# Patient Record
Sex: FEMALE | Race: WHITE | Employment: FULL TIME | URBAN - METROPOLITAN AREA
[De-identification: names, ages, dates, MRNs, and addresses within clinical notes are randomized per-mention and may not be internally consistent; named-entity substitution may affect disease eponyms.]

---

## 2017-02-27 LAB — MAMMOGRAPHY, EXTERNAL: NORMAL

## 2018-10-31 ENCOUNTER — OFFICE VISIT (OUTPATIENT)
Dept: FAMILY MEDICINE CLINIC | Age: 48
End: 2018-10-31

## 2018-10-31 VITALS
HEART RATE: 66 BPM | BODY MASS INDEX: 23.49 KG/M2 | SYSTOLIC BLOOD PRESSURE: 108 MMHG | OXYGEN SATURATION: 98 % | WEIGHT: 137.6 LBS | RESPIRATION RATE: 18 BRPM | HEIGHT: 64 IN | TEMPERATURE: 97.8 F | DIASTOLIC BLOOD PRESSURE: 70 MMHG

## 2018-10-31 DIAGNOSIS — Z23 ENCOUNTER FOR IMMUNIZATION: ICD-10-CM

## 2018-10-31 DIAGNOSIS — Z00.00 ROUTINE GENERAL MEDICAL EXAMINATION AT A HEALTH CARE FACILITY: Primary | ICD-10-CM

## 2018-10-31 NOTE — PROGRESS NOTES
Chief Complaint Patient presents with  Anemia  
  gyn advised pcp check labs due to clotting during menses - otherwise patient feels well and healthy 1. Have you been to the ER, urgent care clinic since your last visit? Hospitalized since your last visit? No 
 
2. Have you seen or consulted any other health care providers outside of the [address] [place] [address] since your last visit? Include any pap smears or colon screening.  No

## 2018-10-31 NOTE — PATIENT INSTRUCTIONS

## 2018-10-31 NOTE — LETTER
11/11/2018 [address] [place] [address] [phone] [id number] Dear Clare Flores: 
 
Please find your most recent results below. Resulted Orders CBC W/O DIFF Result Value Ref Range WBC 4.2 3.4 - 10.8 x10E3/uL  
 RBC 4.00 3.77 - 5.28 x10E6/uL HGB 12.8 11.1 - 15.9 g/dL HCT 39.7 34.0 - 46.6 % MCV 99 (H) 79 - 97 fL  
 MCH 32.0 26.6 - 33.0 pg  
 MCHC 32.2 31.5 - 35.7 g/dL  
 RDW 13.3 12.3 - 15.4 % PLATELET 881 126 - 002 x10E3/uL Narrative Performed at:  [address]  [id number] : Michael Pérez MD, Phone:  [phone] LIPID PANEL Result Value Ref Range Cholesterol, total 150 100 - 199 mg/dL Triglyceride 54 0 - 149 mg/dL HDL Cholesterol 75 >39 mg/dL VLDL, calculated 11 5 - 40 mg/dL LDL, calculated 64 0 - 99 mg/dL Narrative Performed at:  [address]  [id number] : Michael Pérez MD, Phone:  [phone] METABOLIC PANEL, COMPREHENSIVE Result Value Ref Range Glucose 85 65 - 99 mg/dL BUN 10 6 - 24 mg/dL Creatinine 0.66 0.57 - 1.00 mg/dL GFR est non- >59 mL/min/1.73 GFR est  >59 mL/min/1.73  
 BUN/Creatinine ratio 15 9 - 23 Sodium 143 134 - 144 mmol/L Potassium 4.4 3.5 - 5.2 mmol/L Chloride 105 96 - 106 mmol/L  
 CO2 24 20 - 29 mmol/L Calcium 9.7 8.7 - 10.2 mg/dL Protein, total 7.1 6.0 - 8.5 g/dL Albumin 4.7 3.5 - 5.5 g/dL GLOBULIN, TOTAL 2.4 1.5 - 4.5 g/dL A-G Ratio 2.0 1.2 - 2.2 Bilirubin, total 0.5 0.0 - 1.2 mg/dL Alk. phosphatase 52 39 - 117 IU/L  
 AST (SGOT) 17 0 - 40 IU/L  
 ALT (SGPT) 17 0 - 32 IU/L Narrative Performed at:  [address]  [id number] : Michael Pérez MD, Phone:  [phone] TSH 3RD GENERATION Result Value Ref Range TSH/Thyroid function 2.690 0.450 - 4.500 uIU/mL Narrative Performed at:  [address]  [id number] : Opal Willams MD, Phone:  [phone] CVD REPORT Result Value Ref Range INTERPRETATION Note Comment:  
   Supplemental report is available. Narrative Performed at:  [address] [place] [address]  [id number] : Gian Soto MD, Phone:  [phone] Overall things are looking good! Please call me if you have any questions: [phone]. Otherwise schedule your next annual fasting checkup for one year. Sincerely, Carl Villafuerte MD

## 2018-11-01 LAB
ALBUMIN SERPL-MCNC: 4.7 G/DL (ref 3.5–5.5)
ALBUMIN/GLOB SERPL: 2 {RATIO} (ref 1.2–2.2)
ALP SERPL-CCNC: 52 IU/L (ref 39–117)
ALT SERPL-CCNC: 17 IU/L (ref 0–32)
AST SERPL-CCNC: 17 IU/L (ref 0–40)
BILIRUB SERPL-MCNC: 0.5 MG/DL (ref 0–1.2)
BUN SERPL-MCNC: 10 MG/DL (ref 6–24)
BUN/CREAT SERPL: 15 (ref 9–23)
CALCIUM SERPL-MCNC: 9.7 MG/DL (ref 8.7–10.2)
CHLORIDE SERPL-SCNC: 105 MMOL/L (ref 96–106)
CHOLEST SERPL-MCNC: 150 MG/DL (ref 100–199)
CO2 SERPL-SCNC: 24 MMOL/L (ref 20–29)
CREAT SERPL-MCNC: 0.66 MG/DL (ref 0.57–1)
ERYTHROCYTE [DISTWIDTH] IN BLOOD BY AUTOMATED COUNT: 13.3 % (ref 12.3–15.4)
GLOBULIN SER CALC-MCNC: 2.4 G/DL (ref 1.5–4.5)
GLUCOSE SERPL-MCNC: 85 MG/DL (ref 65–99)
HCT VFR BLD AUTO: 39.7 % (ref 34–46.6)
HDLC SERPL-MCNC: 75 MG/DL
HGB BLD-MCNC: 12.8 G/DL (ref 11.1–15.9)
INTERPRETATION, 910389: NORMAL
LDLC SERPL CALC-MCNC: 64 MG/DL (ref 0–99)
MCH RBC QN AUTO: 32 PG (ref 26.6–33)
MCHC RBC AUTO-ENTMCNC: 32.2 G/DL (ref 31.5–35.7)
MCV RBC AUTO: 99 FL (ref 79–97)
PLATELET # BLD AUTO: 186 X10E3/UL (ref 150–379)
POTASSIUM SERPL-SCNC: 4.4 MMOL/L (ref 3.5–5.2)
PROT SERPL-MCNC: 7.1 G/DL (ref 6–8.5)
RBC # BLD AUTO: 4 X10E6/UL (ref 3.77–5.28)
SODIUM SERPL-SCNC: 143 MMOL/L (ref 134–144)
TRIGL SERPL-MCNC: 54 MG/DL (ref 0–149)
TSH SERPL DL<=0.005 MIU/L-ACNC: 2.69 UIU/ML (ref 0.45–4.5)
VLDLC SERPL CALC-MCNC: 11 MG/DL (ref 5–40)
WBC # BLD AUTO: 4.2 X10E3/UL (ref 3.4–10.8)

## 2018-11-29 ENCOUNTER — TELEPHONE (OUTPATIENT)
Dept: FAMILY MEDICINE CLINIC | Age: 48
End: 2018-11-29

## 2018-11-29 NOTE — TELEPHONE ENCOUNTER
Patient is calling requesting a callback with lab results, patient have not yet to receive anything in the mail.       Best callback: [phone]    LOV:  Wednesday, October 31, 2018

## 2018-12-05 NOTE — TELEPHONE ENCOUNTER
attempted to reach patient to see if she received her letter with results. No answer.  Phone kept ringing    Will close encounter at this time

## 2018-12-18 ENCOUNTER — TELEPHONE (OUTPATIENT)
Dept: FAMILY MEDICINE CLINIC | Age: 48
End: 2018-12-18

## 2018-12-18 NOTE — TELEPHONE ENCOUNTER
----- Message from Janice Willarda sent at 12/18/2018  4:49 PM EST -----  Regarding: Dr. Krishnamurthy Goods: [phone]  Pt would like a recommendation - she says she has been experiencing numbness and tingling in her right thumb for about ten days. Pt says she was doing Yancy cards (about 400) and this is when it started.

## 2018-12-19 NOTE — TELEPHONE ENCOUNTER
Call to patient.  verified. Patient states she was embossing 400 mariann cards and she began to experience numbness and tingling 10 days after in her right thumb    She wants to know if she needs to be seen or referred out or if there are other therapies that can be done. I informed her she most likely does not need to be seen by ortho. She states Dr. Chris Cochran is her pcp but I informed it is documented that Dr. Laura Scott is her pcp as she established are in 2016.  She would like Dr. Chris Cochran to review this message

## 2018-12-20 NOTE — TELEPHONE ENCOUNTER
Call to patient.  verified.  Informed patient that an office visit is needed for proper eval. She stats she will wait until after the holidays and if it is still bothering her she will come in

## 2021-04-06 ENCOUNTER — TRANSCRIBE ORDER (OUTPATIENT)
Dept: SCHEDULING | Age: 51
End: 2021-04-06

## 2021-04-06 DIAGNOSIS — M54.16 LUMBAR RADICULITIS: Primary | ICD-10-CM

## 2021-04-09 ENCOUNTER — HOSPITAL ENCOUNTER (OUTPATIENT)
Dept: MRI IMAGING | Age: 51
Discharge: HOME | End: 2021-04-09
Attending: NURSE PRACTITIONER
Payer: COMMERCIAL

## 2021-04-09 DIAGNOSIS — M54.16 LUMBAR RADICULITIS: ICD-10-CM

## 2021-04-09 PROCEDURE — 72148 MRI LUMBAR SPINE W/O DYE: CPT

## 2021-11-30 ENCOUNTER — TELEPHONE (OUTPATIENT)
Dept: FAMILY MEDICINE CLINIC | Age: 51
End: 2021-11-30

## 2021-11-30 NOTE — TELEPHONE ENCOUNTER
Pt states that the family is passing around sinus congestion. Family did drive thru covid testing on Sunday and everyone was neg.  went to [facility] and they didn't give him anything and told him if he didn't get better to [facility] PCP, Dr Isaias Herring. Dr Isaias Herring started him on ABX. Pediatrician started son on ABX as well. Pt states that she hasn't ever had sinus pressure this high up in her nose. It is thick yellow and green, no fever, no cough. She has been taking Nyquil, dayquil, sudafed and tylenol b/c it hurts. She didn't know if she needed to get ABX too. I asked if she had tried the netti pot and she hadn't. I explained how to use that. She will give that a try. Told her I would check with DR Peng Mcnulty and get back with her.

## 2021-11-30 NOTE — TELEPHONE ENCOUNTER
Patient called stated she has sinus and congestion, would like to know if the provider will call in an antibiotic.     Requesting a call back    Best call back #[phone]

## 2021-12-01 NOTE — TELEPHONE ENCOUNTER
This appears to have been a prior patient of Dr. Sonali Allen, then Dr. Shay Godfrey (maybe Dr. Chani Mayer years ago???). I saw patient once for a CPE in  and she has not been seen in our office since then and is technically considered a new patient. So she needs to be scheduled w/ a new provider to establish care. Nonetheless for this acute issue, she can be booked as a virtual visit for an acute visit only w/ anyone who has available virtual visit or be referred to our urgent care.

## 2021-12-01 NOTE — TELEPHONE ENCOUNTER
Called pt and she states that she has made an appt with [facility] for tomorrow and she will just see them

## 2022-02-24 ENCOUNTER — TELEPHONE (OUTPATIENT)
Dept: FAMILY MEDICINE CLINIC | Age: 52
End: 2022-02-24

## 2022-02-24 NOTE — TELEPHONE ENCOUNTER
----- Message from Nasim Flowers sent at 2/24/2022 10:03 AM EST -----  Subject: Message to Provider    QUESTIONS  Information for Provider? Anais Montano, pt called to see if [facility] is   accepting new pts. Pt has seen Anais for years but has not been in in 3   years so she would re establish care. Anais's 24year old daughter is   needing a new PCP and would like to establish care as well. [facility] attempted   to call practice 3x but phones are appearing to be down. Please contact pt   regarding this matter. Explained that currently we aren't excepting new pt's I did recommend that she try to call again end of March beginning of April and see were we are.

## 2024-11-19 NOTE — PROGRESS NOTES
HISTORY OF PRESENT ILLNESS  
HPI Annual CPE fasting. Good general health. Overall getting along well and feeling well in general. 
No complaints or concerns at this time. Sees gyn annually for well woman visits/gyn exams and mammogram screens. Had gyn exam this week and is scheduled for mammogram 11-15-18. REVIEW OF SYMPTOMS  
  Review of Systems Constitutional: Negative. HENT: Negative. Eyes: Negative. Respiratory: Negative. Cardiovascular: Negative. Gastrointestinal: Negative. Genitourinary: Negative. Regular periods q month, moderate to heavy flow. Sees gyn. No acute changes or concerns at this time. Musculoskeletal: Negative. Skin:  
     Sees Dermatologist q 6 months for skin cancer screenings in light of family h/o melanoma Neurological: Negative. Endo/Heme/Allergies: Negative.   
 
 
  
 PROBLEM LIST/MEDICAL HISTORY  
  
Problem List  Date Reviewed: 10/31/2018 None  
  
 
 
  
 PAST SURGICAL HISTORY  
  History reviewed. No pertinent surgical history. MEDICATIONS  
  
No current outpatient medications on file. No current facility-administered medications for this visit. ALLERGIES Allergies Allergen Reactions  Demerol [Meperidine] Other (comments) Difficulty waking up SOCIAL HISTORY  
   
Social History Socioeconomic History  Marital status:  Spouse name: Not on file  Number of children: Not on file  Years of education: Not on file  Highest education level: Not on file Social Needs  Financial resource strain: Not on file  Food insecurity - worry: Not on file  Food insecurity - inability: Not on file  Transportation needs - medical: Not on file  Transportation needs - non-medical: Not on file Occupational History  Occupation: Works for [employer] Tobacco Use  Smoking status: Never Smoker  Smokeless tobacco: Never Used Substance and Sexual Activity  Alcohol use: Yes Comment: occ  Drug use: No  
 Sexual activity: Yes  
  Partners: Male Birth control/protection: Rhythm Other Topics Concern [address] Service Not Asked  Blood Transfusions Not Asked  Caffeine Concern No  
  Comment: 1 cup of coffee qam  
 Occupational Exposure Not Asked [place] Hazards Not Asked  Sleep Concern Not Asked  Stress Concern Not Asked  Weight Concern Not Asked  Special Diet No  
 Back Care Not Asked  Exercise Yes Comment: 4-5 days a week: speed walks or runs in neighborhood x 1 hr  Bike Helmet Not Asked  Seat Belt Not Asked  Self-Exams Not Asked Social History Narrative  Not on file  
 
  
IMMUNIZATIONS Immunization History Administered Date(s) Administered  Influenza Vaccine 10/05/2018  Influenza Vaccine Split 10/17/2012  Tdap 10/31/2018 FAMILY HISTORY Family History Problem Relation Age of Onset  Cancer Mother   
     mother  of melanoma age 58  Hypertension Father  Diabetes Paternal Grandmother  Hypertension Paternal Grandfather  Cancer Maternal Uncle  No Known Problems Daughter  No Known Problems Son  No Known Problems Son  No Known Problems Son  No Known Problems Son Lakeisha Benitez Visit Vitals /70 (BP 1 Location: Left arm, BP Patient Position: Sitting) Pulse 66 Temp 97.8 °F (36.6 °C) (Oral) Resp 18 Ht 5' 4\" (1.626 m) Wt 137 lb 9.6 oz (62.4 kg) LMP 10/21/2018 (Exact Date) SpO2 98% BMI 23.62 kg/m² PHYSICAL EXAMINATION  
  Physical Exam  
Constitutional: She is oriented to person, place, and time and well-developed, well-nourished, and in no distress. HENT:  
Right Ear: Tympanic membrane normal.  
Left Ear: Tympanic membrane normal.  
Mouth/Throat: Oropharynx is clear and moist. No oropharyngeal exudate.   
Eyes: Conjunctivae and EOM are normal. Pupils are equal, round, and reactive to light. Neck: Neck supple. No thyromegaly present. Cardiovascular: Normal rate, regular rhythm and normal heart sounds. No murmur heard. Pulmonary/Chest: Effort normal and breath sounds normal.  
Abdominal: Soft. Bowel sounds are normal. She exhibits no distension and no mass. There is no tenderness. Musculoskeletal: Normal range of motion. She exhibits no edema or tenderness. Lymphadenopathy:  
  She has no cervical adenopathy. Neurological: She is alert and oriented to person, place, and time. Gait normal. Coordination normal.  
Skin: Skin is warm. Psychiatric: Mood and affect normal.  
Vitals reviewed. 
 
 
  
 
 
 ASSESSMENT & PLAN  
 
  ICD-10-CM ICD-9-CM 1. Routine general medical examination at a health care facility Z00.00 V70.0 CBC W/O DIFF  
   LIPID PANEL  
   METABOLIC PANEL, COMPREHENSIVE  
   TSH 3RD GENERATION 2. Encounter for immunization Z23 V03.89 TETANUS, DIPHTHERIA TOXOIDS AND ACELLULAR PERTUSSIS VACCINE (TDAP), IN INDIVIDS. >=7, IM  
   NM IMMUNIZ ADMIN,1 SINGLE/COMB VAC/TOXOID Fasting labs today Reviewed diet, nutrition, exercise, weight management, BMI/goals, age/risk based screening recommendations, health maintenance & prevention counseling. Sees gyn annually for well woman visits/gyn exams and mammogram screens. Had gyn exam this week and is scheduled for mammogram 11-15-18. Further follow up & other recommendations pending review of labs.  If all remains good and stable, follow up in 1 yr, sooner prn 
  
 
  
  
 
 [[id number]]